# Patient Record
Sex: MALE | Race: WHITE | NOT HISPANIC OR LATINO | Employment: OTHER | URBAN - METROPOLITAN AREA
[De-identification: names, ages, dates, MRNs, and addresses within clinical notes are randomized per-mention and may not be internally consistent; named-entity substitution may affect disease eponyms.]

---

## 2017-01-20 ENCOUNTER — PREPPED CHART (OUTPATIENT)
Dept: URBAN - METROPOLITAN AREA CLINIC 35 | Facility: CLINIC | Age: 67
End: 2017-01-20

## 2017-08-11 NOTE — PROCEDURE NOTE: CLINICAL
PROCEDURE NOTE: YAG Capsulotomy OU. Diagnosis: Visually Significant [address] Anesthesia: Topical. Prior to treatment, the risks/benefits/alternatives were discussed. The patient wished to proceed with procedure. Alphagan P given pre and post laser. Power = * mJ. Number of pulses = *. Patient tolerated procedure well. There were no complications. Post-procedure instructions given. Post laser IOP = * mmHg. Patient tolerated procedure well. There were no complications. Post-procedure instructions given. Nicholas Wong

## 2018-01-10 ASSESSMENT — VISUAL ACUITY
OD_CC: J2
OS_CC: J2
OD_CC: 20/25
OS_CC: 20/25

## 2018-01-10 ASSESSMENT — TONOMETRY
OD_IOP_MMHG: 13
OS_IOP_MMHG: 13

## 2018-01-16 ENCOUNTER — ESTABLISHED COMPREHENSIVE EXAM (OUTPATIENT)
Dept: URBAN - METROPOLITAN AREA CLINIC 35 | Facility: CLINIC | Age: 68
End: 2018-01-16

## 2018-01-16 DIAGNOSIS — H43.811: ICD-10-CM

## 2018-01-16 DIAGNOSIS — H35.372: ICD-10-CM

## 2018-01-16 DIAGNOSIS — H43.21: ICD-10-CM

## 2018-01-16 DIAGNOSIS — H25.13: ICD-10-CM

## 2018-01-16 PROCEDURE — 1036F TOBACCO NON-USER: CPT

## 2018-01-16 PROCEDURE — G8427 DOCREV CUR MEDS BY ELIG CLIN: HCPCS

## 2018-01-16 PROCEDURE — 92015 DETERMINE REFRACTIVE STATE: CPT

## 2018-01-16 PROCEDURE — 92134 CPTRZ OPH DX IMG PST SGM RTA: CPT

## 2018-01-16 PROCEDURE — 92014 COMPRE OPH EXAM EST PT 1/>: CPT

## 2018-01-16 ASSESSMENT — VISUAL ACUITY
OD_CC: J2+
OS_CC: J2
OS_CC: 20/25+
OD_CC: 20/25+
OS_BAT: 20/60
OD_BAT: 20/60

## 2018-01-16 ASSESSMENT — TONOMETRY
OS_IOP_MMHG: 15
OD_IOP_MMHG: 15

## 2018-10-24 NOTE — PATIENT DISCUSSION
Discussed the risks/benefits of laser capsulotomy. Laser recommended. Patient elects to proceed. surgery

## 2019-01-31 ENCOUNTER — ESTABLISHED COMPREHENSIVE EXAM (OUTPATIENT)
Dept: URBAN - METROPOLITAN AREA CLINIC 35 | Facility: CLINIC | Age: 69
End: 2019-01-31

## 2019-01-31 DIAGNOSIS — H35.372: ICD-10-CM

## 2019-01-31 DIAGNOSIS — H43.813: ICD-10-CM

## 2019-01-31 DIAGNOSIS — H31.001: ICD-10-CM

## 2019-01-31 DIAGNOSIS — H43.21: ICD-10-CM

## 2019-01-31 DIAGNOSIS — H25.13: ICD-10-CM

## 2019-01-31 PROCEDURE — 92014 COMPRE OPH EXAM EST PT 1/>: CPT

## 2019-01-31 PROCEDURE — 92134 CPTRZ OPH DX IMG PST SGM RTA: CPT

## 2019-01-31 PROCEDURE — 92015 DETERMINE REFRACTIVE STATE: CPT

## 2019-01-31 ASSESSMENT — TONOMETRY
OD_IOP_MMHG: 13
OS_IOP_MMHG: 13

## 2019-01-31 ASSESSMENT — VISUAL ACUITY
OS_CC: J1+
OD_CC: 20/25-1
OS_CC: 20/30-1
OD_CC: J1

## 2020-01-30 ENCOUNTER — ESTABLISHED COMPREHENSIVE EXAM (OUTPATIENT)
Dept: URBAN - METROPOLITAN AREA CLINIC 35 | Facility: CLINIC | Age: 70
End: 2020-01-30

## 2020-01-30 DIAGNOSIS — H35.372: ICD-10-CM

## 2020-01-30 DIAGNOSIS — H43.813: ICD-10-CM

## 2020-01-30 DIAGNOSIS — H43.21: ICD-10-CM

## 2020-01-30 DIAGNOSIS — H25.13: ICD-10-CM

## 2020-01-30 DIAGNOSIS — H52.4: ICD-10-CM

## 2020-01-30 DIAGNOSIS — H31.001: ICD-10-CM

## 2020-01-30 PROCEDURE — 92134 CPTRZ OPH DX IMG PST SGM RTA: CPT

## 2020-01-30 PROCEDURE — 92015 DETERMINE REFRACTIVE STATE: CPT

## 2020-01-30 PROCEDURE — 92202 OPSCPY EXTND ON/MAC DRAW: CPT

## 2020-01-30 PROCEDURE — 92014 COMPRE OPH EXAM EST PT 1/>: CPT

## 2020-01-30 ASSESSMENT — VISUAL ACUITY
OD_CC: 20/30-1
OD_CC: J1
OD_PH: 20/25
OS_CC: J1
OS_PH: 20/25
OS_CC: 20/40

## 2020-01-30 ASSESSMENT — TONOMETRY
OS_IOP_MMHG: 14
OD_IOP_MMHG: 15

## 2021-03-02 ENCOUNTER — ESTABLISHED COMPREHENSIVE EXAM (OUTPATIENT)
Dept: URBAN - METROPOLITAN AREA CLINIC 35 | Facility: CLINIC | Age: 71
End: 2021-03-02

## 2021-03-02 DIAGNOSIS — H31.001: ICD-10-CM

## 2021-03-02 DIAGNOSIS — H25.13: ICD-10-CM

## 2021-03-02 DIAGNOSIS — H35.372: ICD-10-CM

## 2021-03-02 DIAGNOSIS — H52.4: ICD-10-CM

## 2021-03-02 DIAGNOSIS — H43.813: ICD-10-CM

## 2021-03-02 DIAGNOSIS — H43.21: ICD-10-CM

## 2021-03-02 PROCEDURE — 92250 FUNDUS PHOTOGRAPHY W/I&R: CPT

## 2021-03-02 PROCEDURE — 92134 CPTRZ OPH DX IMG PST SGM RTA: CPT

## 2021-03-02 PROCEDURE — 92014 COMPRE OPH EXAM EST PT 1/>: CPT

## 2021-03-02 PROCEDURE — 92015 DETERMINE REFRACTIVE STATE: CPT

## 2021-03-02 ASSESSMENT — VISUAL ACUITY
OD_CC: 20/20-1
OS_CC: J1
OD_CC: J1
OS_CC: 20/25-1

## 2021-03-02 ASSESSMENT — TONOMETRY
OS_IOP_MMHG: 14
OD_IOP_MMHG: 15

## 2022-04-28 ENCOUNTER — COMPREHENSIVE EXAM (OUTPATIENT)
Dept: URBAN - METROPOLITAN AREA CLINIC 35 | Facility: CLINIC | Age: 72
End: 2022-04-28

## 2022-04-28 DIAGNOSIS — H25.13: ICD-10-CM

## 2022-04-28 DIAGNOSIS — H52.4: ICD-10-CM

## 2022-04-28 DIAGNOSIS — H31.001: ICD-10-CM

## 2022-04-28 DIAGNOSIS — H35.372: ICD-10-CM

## 2022-04-28 DIAGNOSIS — H43.21: ICD-10-CM

## 2022-04-28 DIAGNOSIS — H43.813: ICD-10-CM

## 2022-04-28 PROCEDURE — 92014 COMPRE OPH EXAM EST PT 1/>: CPT

## 2022-04-28 PROCEDURE — 92250 FUNDUS PHOTOGRAPHY W/I&R: CPT

## 2022-04-28 PROCEDURE — 92015 DETERMINE REFRACTIVE STATE: CPT

## 2022-04-28 ASSESSMENT — VISUAL ACUITY
OD_CC: 20/25+2
OU_CC: 20/20
OU_CC: J1
OS_CC: 20/25+2
OD_CC: J1
OS_CC: J1

## 2022-04-28 ASSESSMENT — TONOMETRY
OD_IOP_MMHG: 15
OS_IOP_MMHG: 15

## 2023-04-07 ENCOUNTER — COMPREHENSIVE EXAM (OUTPATIENT)
Dept: URBAN - METROPOLITAN AREA CLINIC 35 | Facility: CLINIC | Age: 73
End: 2023-04-07

## 2023-04-07 DIAGNOSIS — H31.001: ICD-10-CM

## 2023-04-07 DIAGNOSIS — H04.123: ICD-10-CM

## 2023-04-07 DIAGNOSIS — H43.21: ICD-10-CM

## 2023-04-07 DIAGNOSIS — H52.7: ICD-10-CM

## 2023-04-07 DIAGNOSIS — H43.813: ICD-10-CM

## 2023-04-07 DIAGNOSIS — H25.813: ICD-10-CM

## 2023-04-07 DIAGNOSIS — H35.372: ICD-10-CM

## 2023-04-07 PROCEDURE — 92015 DETERMINE REFRACTIVE STATE: CPT

## 2023-04-07 PROCEDURE — 99214 OFFICE O/P EST MOD 30 MIN: CPT

## 2023-04-07 ASSESSMENT — VISUAL ACUITY
OD_SC: 20/80+1
OD_CC: J1-
OD_CC: 20/20-1
OS_CC: 20/20-2
OD_SC: J12
OS_SC: J12
OS_SC: 20/100-1
OS_CC: J1-

## 2023-04-07 ASSESSMENT — TONOMETRY
OS_IOP_MMHG: 12
OD_IOP_MMHG: 12

## 2024-03-29 ENCOUNTER — COMPREHENSIVE EXAM (OUTPATIENT)
Dept: URBAN - METROPOLITAN AREA CLINIC 35 | Facility: CLINIC | Age: 74
End: 2024-03-29

## 2024-03-29 DIAGNOSIS — H43.21: ICD-10-CM

## 2024-03-29 DIAGNOSIS — H43.813: ICD-10-CM

## 2024-03-29 DIAGNOSIS — H25.813: ICD-10-CM

## 2024-03-29 DIAGNOSIS — H52.7: ICD-10-CM

## 2024-03-29 DIAGNOSIS — H35.372: ICD-10-CM

## 2024-03-29 DIAGNOSIS — H33.301: ICD-10-CM

## 2024-03-29 DIAGNOSIS — H04.123: ICD-10-CM

## 2024-03-29 PROCEDURE — 92014 COMPRE OPH EXAM EST PT 1/>: CPT

## 2024-03-29 PROCEDURE — 92015 DETERMINE REFRACTIVE STATE: CPT

## 2024-03-29 ASSESSMENT — TONOMETRY
OS_IOP_MMHG: 11
OD_IOP_MMHG: 12

## 2024-03-29 ASSESSMENT — VISUAL ACUITY
OU_CC: 20/25
OD_CC: J1
OU_CC: J2
OD_CC: 20/25
OS_CC: 20/25
OS_CC: J3

## 2024-12-18 ENCOUNTER — CONSULTATION/EVALUATION (OUTPATIENT)
Age: 74
End: 2024-12-18

## 2024-12-18 DIAGNOSIS — L98.8: ICD-10-CM

## 2024-12-18 DIAGNOSIS — H02.832: ICD-10-CM

## 2024-12-18 DIAGNOSIS — H02.831: ICD-10-CM

## 2024-12-18 DIAGNOSIS — H02.835: ICD-10-CM

## 2024-12-18 DIAGNOSIS — H02.834: ICD-10-CM

## 2024-12-18 PROCEDURE — 99214 OFFICE O/P EST MOD 30 MIN: CPT

## 2024-12-18 PROCEDURE — 92285 EXTERNAL OCULAR PHOTOGRAPHY: CPT

## 2024-12-20 ENCOUNTER — TECH ONLY (OUTPATIENT)
Age: 74
End: 2024-12-20

## 2024-12-20 DIAGNOSIS — H02.831: ICD-10-CM

## 2024-12-20 DIAGNOSIS — H02.834: ICD-10-CM

## 2024-12-20 PROCEDURE — 92081 LIMITED VISUAL FIELD XM: CPT

## 2024-12-20 PROCEDURE — 99211T TECH SERVICE

## 2025-02-19 ENCOUNTER — CONSULTATION/EVALUATION (OUTPATIENT)
Age: 75
End: 2025-02-19

## 2025-02-19 ENCOUNTER — PRE-OP/H&P (OUTPATIENT)
Age: 75
End: 2025-02-19

## 2025-02-19 DIAGNOSIS — H02.835: ICD-10-CM

## 2025-02-19 DIAGNOSIS — H02.832: ICD-10-CM

## 2025-02-19 DIAGNOSIS — H02.834: ICD-10-CM

## 2025-02-19 DIAGNOSIS — H02.831: ICD-10-CM

## 2025-02-19 PROCEDURE — 99211T TECH SERVICE

## 2025-02-19 PROCEDURE — 99499NC CONSULT, COSMETIC NO CHARGE

## 2025-02-26 ENCOUNTER — PRE-OP/H&P (OUTPATIENT)
Age: 75
End: 2025-02-26

## 2025-02-26 ENCOUNTER — SURGERY/PROCEDURE (OUTPATIENT)
Age: 75
End: 2025-02-26

## 2025-02-26 DIAGNOSIS — H02.834: ICD-10-CM

## 2025-02-26 DIAGNOSIS — H02.835: ICD-10-CM

## 2025-02-26 DIAGNOSIS — H02.831: ICD-10-CM

## 2025-02-26 DIAGNOSIS — H02.832: ICD-10-CM

## 2025-02-26 PROCEDURE — 99211T TECH SERVICE

## 2025-02-26 PROCEDURE — 15821 BLEPHARP LWR EYELID FAT PAD: CPT

## 2025-02-26 PROCEDURE — 1582350 UPPER BLEPH PER EYE FUNCTIONAL-BILATERAL: Mod: 50

## 2025-02-27 ENCOUNTER — POST-OP (OUTPATIENT)
Age: 75
End: 2025-02-27

## 2025-02-27 DIAGNOSIS — Z98.890: ICD-10-CM

## 2025-02-27 PROCEDURE — 99024 POSTOP FOLLOW-UP VISIT: CPT

## 2025-03-05 ENCOUNTER — POST-OP (OUTPATIENT)
Age: 75
End: 2025-03-05

## 2025-03-05 DIAGNOSIS — Z98.890: ICD-10-CM

## 2025-03-05 PROCEDURE — 99024 POSTOP FOLLOW-UP VISIT: CPT

## 2025-03-12 ENCOUNTER — POST-OP (OUTPATIENT)
Age: 75
End: 2025-03-12

## 2025-03-12 DIAGNOSIS — Z98.890: ICD-10-CM

## 2025-03-12 PROCEDURE — 99024 POSTOP FOLLOW-UP VISIT: CPT

## 2025-04-02 ENCOUNTER — POST-OP (OUTPATIENT)
Age: 75
End: 2025-04-02

## 2025-04-02 DIAGNOSIS — Z98.890: ICD-10-CM

## 2025-04-02 PROCEDURE — 99024 POSTOP FOLLOW-UP VISIT: CPT

## 2025-04-10 ENCOUNTER — COMPREHENSIVE EXAM (OUTPATIENT)
Age: 75
End: 2025-04-10

## 2025-04-10 DIAGNOSIS — H25.813: ICD-10-CM

## 2025-04-10 DIAGNOSIS — H52.7: ICD-10-CM

## 2025-04-10 DIAGNOSIS — H02.835: ICD-10-CM

## 2025-04-10 DIAGNOSIS — H33.301: ICD-10-CM

## 2025-04-10 DIAGNOSIS — L98.8: ICD-10-CM

## 2025-04-10 DIAGNOSIS — H35.372: ICD-10-CM

## 2025-04-10 DIAGNOSIS — Z98.890: ICD-10-CM

## 2025-04-10 DIAGNOSIS — H43.813: ICD-10-CM

## 2025-04-10 DIAGNOSIS — H02.831: ICD-10-CM

## 2025-04-10 DIAGNOSIS — H43.21: ICD-10-CM

## 2025-04-10 DIAGNOSIS — H02.834: ICD-10-CM

## 2025-04-10 DIAGNOSIS — H02.832: ICD-10-CM

## 2025-04-10 PROCEDURE — 92014 COMPRE OPH EXAM EST PT 1/>: CPT

## 2025-04-10 PROCEDURE — 92015 DETERMINE REFRACTIVE STATE: CPT

## 2025-04-10 PROCEDURE — 92250 FUNDUS PHOTOGRAPHY W/I&R: CPT
